# Patient Record
Sex: MALE | Race: OTHER | HISPANIC OR LATINO | URBAN - METROPOLITAN AREA
[De-identification: names, ages, dates, MRNs, and addresses within clinical notes are randomized per-mention and may not be internally consistent; named-entity substitution may affect disease eponyms.]

---

## 2021-03-17 ENCOUNTER — EMERGENCY (EMERGENCY)
Facility: HOSPITAL | Age: 66
LOS: 1 days | Discharge: HOME | End: 2021-03-17
Attending: EMERGENCY MEDICINE | Admitting: EMERGENCY MEDICINE
Payer: COMMERCIAL

## 2021-03-17 VITALS
RESPIRATION RATE: 18 BRPM | WEIGHT: 184.97 LBS | DIASTOLIC BLOOD PRESSURE: 74 MMHG | HEIGHT: 69 IN | HEART RATE: 100 BPM | OXYGEN SATURATION: 98 % | TEMPERATURE: 98 F | SYSTOLIC BLOOD PRESSURE: 143 MMHG

## 2021-03-17 DIAGNOSIS — R21 RASH AND OTHER NONSPECIFIC SKIN ERUPTION: ICD-10-CM

## 2021-03-17 DIAGNOSIS — L30.9 DERMATITIS, UNSPECIFIED: ICD-10-CM

## 2021-03-17 LAB
ALBUMIN SERPL ELPH-MCNC: 4.1 G/DL — SIGNIFICANT CHANGE UP (ref 3.3–5)
ALP SERPL-CCNC: 100 U/L — SIGNIFICANT CHANGE UP (ref 40–120)
ALT FLD-CCNC: 21 U/L — SIGNIFICANT CHANGE UP (ref 10–45)
ANION GAP SERPL CALC-SCNC: 10 MMOL/L — SIGNIFICANT CHANGE UP (ref 5–17)
AST SERPL-CCNC: 22 U/L — SIGNIFICANT CHANGE UP (ref 10–40)
BASOPHILS # BLD AUTO: 0.06 K/UL — SIGNIFICANT CHANGE UP (ref 0–0.2)
BASOPHILS NFR BLD AUTO: 0.6 % — SIGNIFICANT CHANGE UP (ref 0–2)
BILIRUB SERPL-MCNC: 0.3 MG/DL — SIGNIFICANT CHANGE UP (ref 0.2–1.2)
BUN SERPL-MCNC: 19 MG/DL — SIGNIFICANT CHANGE UP (ref 7–23)
CALCIUM SERPL-MCNC: 9.6 MG/DL — SIGNIFICANT CHANGE UP (ref 8.4–10.5)
CHLORIDE SERPL-SCNC: 105 MMOL/L — SIGNIFICANT CHANGE UP (ref 96–108)
CO2 SERPL-SCNC: 26 MMOL/L — SIGNIFICANT CHANGE UP (ref 22–31)
CREAT SERPL-MCNC: 0.84 MG/DL — SIGNIFICANT CHANGE UP (ref 0.5–1.3)
EOSINOPHIL # BLD AUTO: 0.32 K/UL — SIGNIFICANT CHANGE UP (ref 0–0.5)
EOSINOPHIL NFR BLD AUTO: 3.2 % — SIGNIFICANT CHANGE UP (ref 0–6)
GLUCOSE SERPL-MCNC: 100 MG/DL — HIGH (ref 70–99)
HCT VFR BLD CALC: 46 % — SIGNIFICANT CHANGE UP (ref 39–50)
HGB BLD-MCNC: 14.7 G/DL — SIGNIFICANT CHANGE UP (ref 13–17)
IMM GRANULOCYTES NFR BLD AUTO: 0.4 % — SIGNIFICANT CHANGE UP (ref 0–1.5)
LYMPHOCYTES # BLD AUTO: 1.94 K/UL — SIGNIFICANT CHANGE UP (ref 1–3.3)
LYMPHOCYTES # BLD AUTO: 19.6 % — SIGNIFICANT CHANGE UP (ref 13–44)
MCHC RBC-ENTMCNC: 26 PG — LOW (ref 27–34)
MCHC RBC-ENTMCNC: 32 GM/DL — SIGNIFICANT CHANGE UP (ref 32–36)
MCV RBC AUTO: 81.4 FL — SIGNIFICANT CHANGE UP (ref 80–100)
MONOCYTES # BLD AUTO: 0.83 K/UL — SIGNIFICANT CHANGE UP (ref 0–0.9)
MONOCYTES NFR BLD AUTO: 8.4 % — SIGNIFICANT CHANGE UP (ref 2–14)
NEUTROPHILS # BLD AUTO: 6.7 K/UL — SIGNIFICANT CHANGE UP (ref 1.8–7.4)
NEUTROPHILS NFR BLD AUTO: 67.8 % — SIGNIFICANT CHANGE UP (ref 43–77)
NRBC # BLD: 0 /100 WBCS — SIGNIFICANT CHANGE UP (ref 0–0)
PLATELET # BLD AUTO: 275 K/UL — SIGNIFICANT CHANGE UP (ref 150–400)
POTASSIUM SERPL-MCNC: 4.5 MMOL/L — SIGNIFICANT CHANGE UP (ref 3.5–5.3)
POTASSIUM SERPL-SCNC: 4.5 MMOL/L — SIGNIFICANT CHANGE UP (ref 3.5–5.3)
PROT SERPL-MCNC: 7.2 G/DL — SIGNIFICANT CHANGE UP (ref 6–8.3)
RBC # BLD: 5.65 M/UL — SIGNIFICANT CHANGE UP (ref 4.2–5.8)
RBC # FLD: 13.5 % — SIGNIFICANT CHANGE UP (ref 10.3–14.5)
SODIUM SERPL-SCNC: 141 MMOL/L — SIGNIFICANT CHANGE UP (ref 135–145)
WBC # BLD: 9.89 K/UL — SIGNIFICANT CHANGE UP (ref 3.8–10.5)
WBC # FLD AUTO: 9.89 K/UL — SIGNIFICANT CHANGE UP (ref 3.8–10.5)

## 2021-03-17 PROCEDURE — 99283 EMERGENCY DEPT VISIT LOW MDM: CPT

## 2021-03-17 PROCEDURE — 80053 COMPREHEN METABOLIC PANEL: CPT

## 2021-03-17 PROCEDURE — 36415 COLL VENOUS BLD VENIPUNCTURE: CPT

## 2021-03-17 PROCEDURE — 85025 COMPLETE CBC W/AUTO DIFF WBC: CPT

## 2021-03-17 PROCEDURE — 99284 EMERGENCY DEPT VISIT MOD MDM: CPT

## 2021-03-17 RX ORDER — DIPHENHYDRAMINE HCL 50 MG
25 CAPSULE ORAL ONCE
Refills: 0 | Status: COMPLETED | OUTPATIENT
Start: 2021-03-17 | End: 2021-03-17

## 2021-03-17 RX ADMIN — Medication 25 MILLIGRAM(S): at 15:52

## 2021-03-17 RX ADMIN — Medication 60 MILLIGRAM(S): at 15:52

## 2021-03-17 NOTE — ED ADULT NURSE NOTE - OBJECTIVE STATEMENT
pt is a 64 y/o M arriving to ED for c/c rash. per pt, reports pruritic macular rash to generalized body. pt states rash has progressively worsened throughout the week, with sloughing of skin noted to bilateral buttock. Seen by dermatologist last week, prescribed ketoconazole and nystatin creams with no improvement. Pt denies any known allergies, denies lip/throat swelling, no difficulty breathing. Pt speaking in full sentences. Pt denies chills, fever, n/v/d, cough, cp or sob.

## 2021-03-17 NOTE — ED PROVIDER NOTE - NSFOLLOWUPINSTRUCTIONS_ED_ALL_ED_FT
Take prednisone as prescribed.    Take benadryl over the counter as directed.     Follow up with dermatologist recommended below.    Return to ED with worsening symptoms or other concerns.          Dermatitis    WHAT YOU NEED TO KNOW:    What is dermatitis? Dermatitis is skin inflammation. You may have an itchy rash, redness, or swelling. You may also have bumps or blisters that crust over or ooze clear fluid.     What causes dermatitis? Dermatitis may be caused by allergens such as dust mites, pet dander, pollen, and certain foods. Dermatitis can also develop when something touches your skin and irritates it or causes an allergic reaction. Examples include soaps, chemicals, latex, and poison ivy.    How is dermatitis diagnosed? Your healthcare provider will examine your skin. He will ask questions about your rash and any other symptoms you have. Tell him if you noticed anything trigger your rash, such as a certain food or activity. Tell him about any medicines you are taking or any allergies or medical conditions you have.     How is dermatitis treated? Treatment depends on the cause of your rash. You may need medicines to help decrease itching and inflammation or treat a bacterial infection. They may be given as a topical cream, shot, or a pill.     How can I manage my symptoms?   •Apply a cool compress to your rash. This will help soothe your skin.       •Keep your skin moist. Rub unscented cream or lotion on your skin to prevent dryness and itching. Do this right after a lukewarm bath or shower when your skin is still damp.      •Avoid skin irritants. Do not use skin irritants, such as makeup, hair products, soaps, and cleansers. Use products that do not contain fragrances or dye.      Call 911 if you have any of the following symptoms of anaphylaxis:   •Sudden trouble breathing      •Throat swelling and tightness      •Dizziness, lightheadedness, fainting, or confusion       When should I seek immediate care?   •You develop a fever or have red streaks going up your arm or leg.      •Your rash gets more swollen, red, or hot.      When should I contact my healthcare provider?   •Your skin blisters, oozes white or yellow pus, or has a foul-smelling discharge.      •Your rash spreads or does not get better, even after treatment.      •You have questions or concerns about your condition or care.      CARE AGREEMENT:    You have the right to help plan your care. Learn about your health condition and how it may be treated. Discuss treatment options with your healthcare providers to decide what care you want to receive. You always have the right to refuse treatment.        © Copyright [employer] 2021           back to top                          © Copyright [employer] 2021

## 2021-03-17 NOTE — ED PROVIDER NOTE - CLINICAL SUMMARY MEDICAL DECISION MAKING FREE TEXT BOX
65M with a rash for x2 weeks that is erythematous and flakey in some areas. Pt previously  prescribed antifungal and Aquaphor. Will order basic blood work, white count, liver panels.     Plan: Will order benadryl and prednisone. Will instruct pt to seek f/u care with a dermatologist and to seek outpatient skin biopsy.     DDx: Exfoliated dermatitis, less likely Александр Trevor, less likely TEN. Considering there is no evidence for allergic reaction or sepsis. 65M with a rash for x2 weeks that is erythematous and flakey in some areas. Pt previously  prescribed antifungal and Aquaphor. Will order basic blood work, white count, liver panels.     Plan: Will order benadryl and prednisone. Will instruct pt to seek f/u care with a dermatologist and to seek outpatient skin biopsy.     DDx: Exfoliated dermatitis, less likely Александр Trevor, less likely TEN. Considering there is no evidence for allergic reaction or sepsis.    Labs wnl, will treat with prednisone and derm follow up

## 2021-03-17 NOTE — ED PROVIDER NOTE - CARE PROVIDER_API CALL
Marycruz Duncan)  Dermatology  [address] [place] [address], [place] [address]  Phone: [phone]  Fax: [phone]  Follow Up Time:

## 2021-03-17 NOTE — ED PROVIDER NOTE - SKIN, MLM
inflammatory, erythematous rash of extremities trunk, and back and groin. Groin and buttocks notable for sloughing rash. No fluctuance, no target signs, no drainage, no blisters, no palm or soles involvement

## 2021-03-17 NOTE — ED PROVIDER NOTE - ENMT, MLM
Airway patent, Nasal mucosa clear. Mouth with normal mucosa. Throat has no vesicles, no oropharyngeal exudates and uvula is midline.  No throat swelling.

## 2021-03-17 NOTE — ED ADULT NURSE NOTE - DOES PATIENT HAVE ADVANCE DIRECTIVE
Called and spoke with Billie at [facility] in [place].  She stated that there is some issue with her insurance.  She stated that her prescription does need a prior authorization and they will send over the form.    PA submitted marked urgent via Cover My Meds:  Zoila Guadalupe (Key: [id number])     The request has received a Pending outcome.  Please note any additional information provided by [employer] at the bottom of your screen.  You will receive a final determination electronically in CoverMyMeds and via email and fax within 24 to 72 hours    Called and spoke with Evelio at [facility] pharmacy notifying him the the PA was submitted but still pending approval to determine if they are able to provide doses to last until Monday.   Evelio stated that they will provide enough medication to get the patient through the weekend.  Called and notified the patient of this.     No

## 2021-03-17 NOTE — ED ADULT TRIAGE NOTE - CHIEF COMPLAINT QUOTE
"He had an allergic reaction for the last week and a half." Pt reports itchy rash over generalized body. Pt denies any known allergies, denies lip/throat swelling, no difficulty breathing. Pt speaking in full sentences. Pt denies chills, fever.

## 2021-03-17 NOTE — ED PROVIDER NOTE - PATIENT PORTAL LINK FT
You can access the FollowMyHealth Patient Portal offered by [facility] by registering at the following website: [email or web address]. By joining [employer]’s FollowMyHealth portal, you will also be able to view your health information using other applications (apps) compatible with our system.

## 2021-03-17 NOTE — ED PROVIDER NOTE - OBJECTIVE STATEMENT
65M presents for a rash of x2 weeks, that is described as red, painful and itchy. First started on antifungal, Ketoconazole  and nystatin upon being seen by dermatologist x2 weeks ago. Patient was then given Aquaphor x1 week ago, with no significant symptom relief. Patient has not used any new creams, lotions, perfumes, detergents, soaps or antibiotic treatments. Patient admits to no throat swelling, no SOB, no fever, no chills or any other acute medical complaints at this time.

## 2021-03-18 PROBLEM — Z00.00 ENCOUNTER FOR PREVENTIVE HEALTH EXAMINATION: Status: ACTIVE | Noted: 2021-03-18

## 2021-03-23 ENCOUNTER — APPOINTMENT (OUTPATIENT)
Dept: DERMATOLOGY | Facility: CLINIC | Age: 66
End: 2021-03-23
Payer: SELF-PAY

## 2021-03-23 PROBLEM — Z78.9 OTHER SPECIFIED HEALTH STATUS: Chronic | Status: ACTIVE | Noted: 2021-03-17

## 2021-03-23 PROCEDURE — 99203 OFFICE O/P NEW LOW 30 MIN: CPT

## 2021-03-30 ENCOUNTER — NON-APPOINTMENT (OUTPATIENT)
Age: 66
End: 2021-03-30

## 2021-04-06 ENCOUNTER — APPOINTMENT (OUTPATIENT)
Dept: DERMATOLOGY | Facility: CLINIC | Age: 66
End: 2021-04-06
Payer: SELF-PAY

## 2021-04-06 DIAGNOSIS — L73.9 FOLLICULAR DISORDER, UNSPECIFIED: ICD-10-CM

## 2021-04-06 PROCEDURE — 99213 OFFICE O/P EST LOW 20 MIN: CPT

## 2021-04-06 RX ORDER — DOXYCYCLINE HYCLATE 100 MG/1
100 TABLET ORAL
Qty: 20 | Refills: 0 | Status: ACTIVE | COMMUNITY
Start: 2021-04-06 | End: 1900-01-01

## 2021-04-21 ENCOUNTER — APPOINTMENT (OUTPATIENT)
Dept: DERMATOLOGY | Facility: CLINIC | Age: 66
End: 2021-04-21
Payer: SELF-PAY

## 2021-04-21 DIAGNOSIS — L30.9 DERMATITIS, UNSPECIFIED: ICD-10-CM

## 2021-04-21 DIAGNOSIS — L72.9 FOLLICULAR CYST OF THE SKIN AND SUBCUTANEOUS TISSUE, UNSPECIFIED: ICD-10-CM

## 2021-04-21 PROCEDURE — 99213 OFFICE O/P EST LOW 20 MIN: CPT

## 2021-04-21 RX ORDER — TRIAMCINOLONE ACETONIDE 1 MG/G
0.1 OINTMENT TOPICAL
Qty: 1 | Refills: 1 | Status: ACTIVE | COMMUNITY
Start: 2021-03-23 | End: 1900-01-01

## 2021-05-18 ENCOUNTER — APPOINTMENT (OUTPATIENT)
Dept: DERMATOLOGY | Facility: CLINIC | Age: 66
End: 2021-05-18

## 2022-04-16 NOTE — ED ADULT NURSE NOTE - NS ED NOTE ABUSE SUSPICION NEGLECT YN
Problem: Falls - Risk of  Goal: *Absence of Falls  Description: Document Marv Felix Fall Risk and appropriate interventions in the flowsheet. Outcome: Progressing Towards Goal  Note: Fall Risk Interventions:            Medication Interventions: Teach patient to arise slowly,Patient to call before getting OOB    Elimination Interventions: Call light in reach,Patient to call for help with toileting needs    History of Falls Interventions:  Investigate reason for fall,Door open when patient unattended         Problem: Patient Education: Go to Patient Education Activity  Goal: Patient/Family Education  Outcome: Progressing Towards Goal     Problem: Discharge Planning  Goal: *Discharge to safe environment  Outcome: Progressing Towards Goal  Goal: *Knowledge of discharge instructions  Outcome: Progressing Towards Goal     Problem: Patient Education: Go to Patient Education Activity  Goal: Patient/Family Education  Outcome: Progressing Towards Goal     Problem: TIA/CVA Stroke: 0-24 hours  Goal: Activity/Safety  Outcome: Progressing Towards Goal  Goal: Psychosocial  Outcome: Progressing Towards Goal  Goal: *Neurologically stable  Description: Absence of additional neurological deficits    Outcome: Progressing Towards Goal  Goal: *Verbalizes anxiety and depression are reduced or absent  Outcome: Progressing Towards Goal No

## 2024-06-07 NOTE — HISTORY OF PRESENT ILLNESS
[FreeTextEntry1] : Mr. Ruff is a 69 y.o male with PMHX of ______________ referred by Dr. Kuhn dx with prostate adenocarcinoma. Biopsy report missing.   PSA 4/1/24--> 2.60 ng/ml  Lupron --> 4/1/24  Ct abd/Pelvis 3/25/24 - no renal or bladder calculi -s/p prostatectomy  -Bilateral renal cysts

## 2024-06-17 ENCOUNTER — NON-APPOINTMENT (OUTPATIENT)
Age: 69
End: 2024-06-17

## 2024-06-18 ENCOUNTER — APPOINTMENT (OUTPATIENT)
Dept: RADIATION ONCOLOGY | Facility: CLINIC | Age: 69
End: 2024-06-18
Payer: MEDICARE

## 2024-06-18 VITALS
TEMPERATURE: 98.1 F | HEIGHT: 68 IN | OXYGEN SATURATION: 97 % | WEIGHT: 190 LBS | RESPIRATION RATE: 16 BRPM | HEART RATE: 77 BPM | BODY MASS INDEX: 28.79 KG/M2 | SYSTOLIC BLOOD PRESSURE: 130 MMHG | DIASTOLIC BLOOD PRESSURE: 82 MMHG

## 2024-06-18 DIAGNOSIS — Z78.9 OTHER SPECIFIED HEALTH STATUS: ICD-10-CM

## 2024-06-18 DIAGNOSIS — Z87.891 PERSONAL HISTORY OF NICOTINE DEPENDENCE: ICD-10-CM

## 2024-06-18 DIAGNOSIS — C61 MALIGNANT NEOPLASM OF PROSTATE: ICD-10-CM

## 2024-06-18 PROCEDURE — 99203 OFFICE O/P NEW LOW 30 MIN: CPT

## 2024-06-18 RX ORDER — NICOTINE POLACRILEX 4 MG/1
4 GUM, CHEWING ORAL
Qty: 24 | Refills: 0 | Status: ACTIVE | COMMUNITY
Start: 2024-06-18 | End: 1900-01-01

## 2024-06-18 NOTE — VITALS
[Maximal Pain Intensity: 0/10] : 0/10 [Least Pain Intensity: 0/10] : 0/10 [90: Able to carry normal activity; minor signs or symptoms of disease.] : 90: Able to carry normal activity; minor signs or symptoms of disease.  [ECOG Performance Status: 0 - Fully active, able to carry on all pre-disease performance without restriction] : Performance Status: 0 - Fully active, able to carry on all pre-disease performance without restriction [Date: ____________] : Patient's last distress assessment performed on [unfilled]. [0 - No Distress] : Distress Level: 0

## 2024-06-18 NOTE — SOCIAL HISTORY
[Current Cigarette ___ Pack(s) per day] : current cigarette pack(s) per day:  [unfilled]  [de-identified] : Social alcohol

## 2024-06-18 NOTE — DISEASE MANAGEMENT
[Pathological] : TNM Stage: p [FreeTextEntry4] : Biochemical failure post prostatectomy [TTNM] : 2 [NTNM] : x [MTNM] : 0 [N/A] : Currently not applicable

## 2024-06-18 NOTE — HISTORY OF PRESENT ILLNESS
[FreeTextEntry1] : Mr. Ruff is a 69 year old male with no family history of Prostate Ca. presented with PSA 13 ng/ml (according to patient's sister) in 2016.Same year had robotic radical prostatectomy by Dr. Pineda at [facility]. Pathology report not available. In December 2022 underwent insertion of penile implant. PSA by Dr. Kuhn on 4/1/24 was 2.6 ng/ml. CT scan abdomen and pelvis revealed status post prostatectomy, no adenopathy or bone changes. Benign renal cyst.  He is experiencing urinary urgency, mild frequency fluid intake related, nocturia 3x, occasional urge incontinence. He denies dysuria or bowel disturbances. He denies wearing pads/depends. Penile implant.  Received one injection LHRH agonist.    Detail Level: Simple

## 2024-06-18 NOTE — LETTER GREETING
[Consult Letter:] : Your patient, [unfilled] was seen in my office today for consultation. [FreeTextEntry2] : Alphonso Kuhn M.D

## 2024-06-18 NOTE — REVIEW OF SYSTEMS
[Nocturia] : nocturia [Urinary Frequency] : urinary frequency [IPSS Score (0-40): ___] : IPSS score: [unfilled] [EPIC-CP Score (0-60): ___] : EPIC-CP score: [unfilled] [Negative] : Allergic/Immunologic [Anal Pain: Grade 0] : Anal Pain: Grade 0 [Constipation: Grade 0] : Constipation: Grade 0 [Diarrhea: Grade 0] : Diarrhea: Grade 0 [Dyspepsia: Grade 0] : Dyspepsia: Grade 0 [Dysphagia: Grade 0] : Dysphagia: Grade 0 [Esophagitis: Grade 0] : Esophagitis: Grade 0 [Fecal Incontinence: Grade 0] : Fecal Incontinence: Grade 0 [Gastroparesis: Grade 0] : Gastroparesis: Grade 0 [Nausea: Grade 0] : Nausea: Grade 0 [Proctitis: Grade 0] : Proctitis: Grade 0 [Rectal Pain: Grade 0] : Rectal Pain: Grade 0 [Vomiting: Grade 0] : Vomiting: Grade 0 [Edema Limbs: Grade 0] : Edema Limbs: Grade 0  [Hematuria: Grade 0] : Hematuria: Grade 0 [Urinary Retention: Grade 0] : Urinary Retention: Grade 0 [Urinary Tract Pain: Grade 0] : Urinary Tract Pain: Grade 0 [Urinary Urgency: Grade 2 - Limiting instrumental ADL; medical management indicated] : Urinary Urgency: Grade 2 - Limiting instrumental ADL; medical management indicated [Urinary Frequency: Grade 2 - Limiting instrumental ADL; medical management indicated] : Urinary Frequency: Grade 2 - Limiting instrumental ADL; medical management indicated [Tinnitus - Grade 0] : Tinnitus - Grade 0 [Cognitive Disturbance: Grade 0] : Cognitive Disturbance: Grade 0 [Concentration Impairment: Grade 0] : Concentration Impairment: Grade 0 [Alopecia: Grade 0] : Alopecia: Grade 0 [Skin Atrophy: Grade 0] : Skin Atrophy: Grade 0 [Urinary Ostomy] : no ~T urinary ostomy present [Urinary Incontinence: Grade 1 - Occasional (e.g., with coughing, sneezing, etc.), pads not indicated] : Urinary Incontinence: Grade 1 - Occasional (e.g., with coughing, sneezing, etc.), pads not indicated [FreeTextEntry7] : penial implant [FreeTextEntry8] : penial implant [FreeTextEntry4] : allergic reaction to a medication   Seeing an allergist

## 2024-06-28 ENCOUNTER — OUTPATIENT (OUTPATIENT)
Dept: OUTPATIENT SERVICES | Facility: HOSPITAL | Age: 69
LOS: 1 days | End: 2024-06-28
Payer: MEDICARE

## 2024-06-28 ENCOUNTER — APPOINTMENT (OUTPATIENT)
Dept: NUCLEAR MEDICINE | Facility: HOSPITAL | Age: 69
End: 2024-06-28

## 2024-06-28 PROCEDURE — A9595: CPT

## 2024-06-28 PROCEDURE — 78816 PET IMAGE W/CT FULL BODY: CPT | Mod: 26

## 2024-06-28 PROCEDURE — 78816 PET IMAGE W/CT FULL BODY: CPT

## 2024-08-02 ENCOUNTER — NON-APPOINTMENT (OUTPATIENT)
Age: 69
End: 2024-08-02

## 2024-08-06 ENCOUNTER — NON-APPOINTMENT (OUTPATIENT)
Age: 69
End: 2024-08-06

## 2024-08-07 NOTE — DISEASE MANAGEMENT
[Pathological] : TNM Stage: p [FreeTextEntry4] : Biochemical failure post prostatectomy [TTNM] : 2 [MTNM] : 0 [NTNM] : x [N/A] : Currently not applicable [de-identified] : 762cGy [de-identified] : 1150cGy [de-identified] : Prostate

## 2024-08-07 NOTE — HISTORY OF PRESENT ILLNESS
[FreeTextEntry1] : Mr. Ruff is a 69 year old male with no family history of Prostate Ca. presented with PSA 13 ng/ml (according to patient's sister) in 2016.Same year had robotic radical prostatectomy by Dr. Pineda at [facility]. Pathology report not available. In December 2022 underwent insertion of penile implant. PSA by Dr. Kuhn on 4/1/24 was 2.6 ng/ml. CT scan abdomen and pelvis revealed status post prostatectomy, no adenopathy or bone changes. Benign renal cyst.  He is experiencing urinary urgency, mild frequency fluid intake related, nocturia 3x, occasional urge incontinence. He denies dysuria or bowel disturbances. He denies wearing pads/depends. Penile implant.  Received one injection LHRH agonist.  8/7/2024 - OTV Patient has received 720/4500cGy of radiation to the Prostate. Today he.....

## 2024-08-08 ENCOUNTER — NON-APPOINTMENT (OUTPATIENT)
Age: 69
End: 2024-08-08

## 2024-08-08 NOTE — PHYSICAL EXAM
[Normal] : the sclera and conjunctiva were normal, pupils were equal in size, round, reactive to light and extraocular movements were intact. [FreeTextEntry1] : Minimal LUTS, occasional.

## 2024-08-08 NOTE — HISTORY OF PRESENT ILLNESS
[FreeTextEntry1] : Mr. Ruff is a 69 year old male with no family history of Prostate Ca. presented with PSA 13 ng/ml (according to patient's sister) in 2016.Same year had robotic radical prostatectomy by Dr. Pineda at [facility]. Pathology report not available. In December 2022 underwent insertion of penile implant. PSA by Dr. Kuhn on 4/1/24 was 2.6 ng/ml. CT scan abdomen and pelvis revealed status post prostatectomy, no adenopathy or bone changes. Benign renal cyst.  He is experiencing urinary urgency, mild frequency fluid intake related, nocturia 3x, occasional urge incontinence. He denies dysuria or bowel disturbances. He denies wearing pads/depends. Penile implant.  Received one injection LHRH agonist.  8/8/2024 - OTV  ID# [id number] Patient has received 720/7020cGy of radiation to the Prostate. Today he.is doing well. Has some urinary urgency and frequency but he contributes to drinking slot of PO intake. He denies urinary dysuria or hematuria. Nocturia X2-3 and on no Flomax. Bowel movements are normal. He will continue planned RT treatments.

## 2024-08-08 NOTE — DISEASE MANAGEMENT
[FreeTextEntry4] : Biochemical failure post prostatectomy [TTNM] : 2 [NTNM] : x [MTNM] : 0 [de-identified] : 265cGy [de-identified] : 7076cGy [de-identified] : Prostate

## 2024-08-13 ENCOUNTER — NON-APPOINTMENT (OUTPATIENT)
Age: 69
End: 2024-08-13

## 2024-08-13 NOTE — REVIEW OF SYSTEMS
[Anal Pain: Grade 0] : Anal Pain: Grade 0 [Diarrhea: Grade 0] : Diarrhea: Grade 0 [Hematuria: Grade 0] : Hematuria: Grade 0 [Urinary Incontinence: Grade 1 - Occasional (e.g., with coughing, sneezing, etc.), pads not indicated] : Urinary Incontinence: Grade 1 - Occasional (e.g., with coughing, sneezing, etc.), pads not indicated [Urinary Retention: Grade 0] : Urinary Retention: Grade 0 [Urinary Tract Pain: Grade 0] : Urinary Tract Pain: Grade 0 [Urinary Urgency: Grade 1 - Present] : Urinary Urgency: Grade 1 - Present [Urinary Frequency: Grade 0] : Urinary Frequency: Grade 0

## 2024-08-13 NOTE — DISEASE MANAGEMENT
[Pathological] : TNM Stage: p [N/A] : Currently not applicable [FreeTextEntry4] : Biochemical failure post prostatectomy [TTNM] : 2 [NTNM] : x [MTNM] : 0 [de-identified] : 1260 cGy [de-identified] : 7030cGy [de-identified] : Prostate

## 2024-08-13 NOTE — HISTORY OF PRESENT ILLNESS
[FreeTextEntry1] : Mr. Ruff is a 69 year old male with no family history of Prostate Ca. presented with PSA 13 ng/ml (according to patient's sister) in 2016.Same year had robotic radical prostatectomy by Dr. Pineda at [facility]. Pathology report not available. In December 2022 underwent insertion of penile implant. PSA by Dr. Kuhn on 4/1/24 was 2.6 ng/ml. CT scan abdomen and pelvis revealed status post prostatectomy, no adenopathy or bone changes. Benign renal cyst.  He is experiencing urinary urgency, mild frequency fluid intake related, nocturia 3x, occasional urge incontinence. He denies dysuria or bowel disturbances. He denies wearing pads/depends. Penile implant.  Received one injection LHRH agonist.  8/13/2024- OTV- 1260/7020 cgy, 7/39 fractions. nocturia 2x, stream is good, no dysuria, no frequency,  occasional urge incontinence with intermittency. unchanged with RT. Regular BM.   8/8/2024 - OTV  ID# [id number] Patient has received 720/7020cGy of radiation to the Prostate. Today he.is doing well. Has some urinary urgency and frequency but he contributes to drinking slot of PO intake. He denies urinary dysuria or hematuria. Nocturia X2-3 and on no Flomax. Bowel movements are normal. He will continue planned RT treatments.

## 2024-08-13 NOTE — PHYSICAL EXAM
[Normal] : the sclera and conjunctiva were normal, pupils were equal in size, round, reactive to light and extraocular movements were intact. [FreeTextEntry1] : Alert. oriented. KPS=90.

## 2024-08-13 NOTE — DISEASE MANAGEMENT
[Pathological] : TNM Stage: p [N/A] : Currently not applicable [FreeTextEntry4] : Biochemical failure post prostatectomy [TTNM] : 2 [NTNM] : x [MTNM] : 0 [de-identified] : 1260 cGy [de-identified] : 7060cGy [de-identified] : Prostate

## 2024-08-13 NOTE — HISTORY OF PRESENT ILLNESS
[FreeTextEntry1] : Mr. Ruff is a 69 year old male with no family history of Prostate Ca. presented with PSA 13 ng/ml (according to patient's sister) in 2016.Same year had robotic radical prostatectomy by Dr. Pineda at [facility]. Pathology report not available. In December 2022 underwent insertion of penile implant. PSA by Dr. Kuhn on 4/1/24 was 2.6 ng/ml. CT scan abdomen and pelvis revealed status post prostatectomy, no adenopathy or bone changes. Benign renal cyst.  He is experiencing urinary urgency, mild frequency fluid intake related, nocturia 3x, occasional urge incontinence. He denies dysuria or bowel disturbances. He denies wearing pads/depends. Penile implant.  Received one injection LHRH agonist.  8/13/2024- OTV- 1260/7020 cgy, 7/39 fractions. nocturia 2x, stream is good, no dysuria, no frequency,  occasional urge incontinence with intermittency. unchanged with RT. Regular BM.   8/8/2024 - OTV  ID# [id number] Patient has received 720/7020cGy of radiation to the Prostate. Today he.is doing well. Has some urinary urgency and frequency but he contributes to drinking slot of PO intake. He denies urinary dysuria or hematuria. Nocturia X2-3 and on no Flomax. Bowel movements are normal. He will continue planned RT treatments.

## 2024-08-21 ENCOUNTER — NON-APPOINTMENT (OUTPATIENT)
Age: 69
End: 2024-08-21

## 2024-08-21 NOTE — HISTORY OF PRESENT ILLNESS
[FreeTextEntry1] : Mr. Ruff is a 69 year old male with no family history of Prostate Ca. presented with PSA 13 ng/ml (according to patient's sister) in 2016.Same year had robotic radical prostatectomy by Dr. Pineda at [facility]. Pathology report not available. In December 2022 underwent insertion of penile implant. PSA by Dr. Kuhn on 4/1/24 was 2.6 ng/ml. CT scan abdomen and pelvis revealed status post prostatectomy, no adenopathy or bone changes. Benign renal cyst.  He is experiencing urinary urgency, mild frequency fluid intake related, nocturia 3x, occasional urge incontinence. He denies dysuria or bowel disturbances. He denies wearing pads/depends. Penile implant.  Received one injection LHRH agonist.  8/21/2024 - OT  # [id number] Patient has received 2340/4500cGy or radiation to the prostate. Patient states he is feeling "fine", he denies dysuria, hematuria, frequency, fatigue, pain or bowel changes. He states he has and continues to have urgency. Patient to continue RT as planned.    8/13/2024- OTV- 1260/7020 cgy, 7/39 fractions. nocturia 2x, stream is good, no dysuria, no frequency,  occasional urge incontinence with intermittency. unchanged with RT. Regular BM.   8/8/2024 - OTV  ID# [id number] Patient has received 720/7020cGy of radiation to the Prostate. Today he.is doing well. Has some urinary urgency and frequency but he contributes to drinking slot of PO intake. He denies urinary dysuria or hematuria. Nocturia X2-3 and on no Flomax. Bowel movements are normal. He will continue planned RT treatments.

## 2024-08-21 NOTE — REVIEW OF SYSTEMS
[Anal Pain: Grade 0] : Anal Pain: Grade 0 [Constipation: Grade 0] : Constipation: Grade 0 [Diarrhea: Grade 0] : Diarrhea: Grade 0 [Nausea: Grade 0] : Nausea: Grade 0 [Vomiting: Grade 0] : Vomiting: Grade 0 [Fatigue: Grade 0] : Fatigue: Grade 0 [Hematuria: Grade 0] : Hematuria: Grade 0 [Urinary Urgency: Grade 1 - Present] : Urinary Urgency: Grade 1 - Present [Urinary Frequency: Grade 0] : Urinary Frequency: Grade 0 [Dizziness: Grade 0] : Dizziness: Grade 0  [Headache: Grade 0] : Headache: Grade 0 [Anxiety: Grade 0] : Anxiety: Grade 0  [Cough: Grade 0] : Cough: Grade 0 [Dyspnea: Grade 0] : Dyspnea: Grade 0 [Dermatitis Radiation: Grade 0] : Dermatitis Radiation: Grade 0

## 2024-08-21 NOTE — DISEASE MANAGEMENT
[Pathological] : TNM Stage: p [N/A] : Currently not applicable [FreeTextEntry4] : Biochemical failure post prostatectomy [TTNM] : 2 [NTNM] : x [MTNM] : 0 [de-identified] : 0045lWi [de-identified] : 0550cGy [de-identified] : Prostate

## 2024-08-28 ENCOUNTER — NON-APPOINTMENT (OUTPATIENT)
Age: 69
End: 2024-08-28

## 2024-08-28 RX ORDER — TAMSULOSIN HYDROCHLORIDE 0.4 MG/1
0.4 CAPSULE ORAL
Qty: 30 | Refills: 5 | Status: ACTIVE | COMMUNITY
Start: 2024-08-28 | End: 1900-01-01

## 2024-08-28 NOTE — DISEASE MANAGEMENT
[Pathological] : TNM Stage: p [N/A] : Currently not applicable [FreeTextEntry4] : Biochemical failure post prostatectomy [TTNM] : 2 [NTNM] : x [MTNM] : 0 [de-identified] : [id number] [de-identified] : 0570cGy [de-identified] : Prostate

## 2024-08-28 NOTE — DISEASE MANAGEMENT
[Pathological] : TNM Stage: p [N/A] : Currently not applicable [FreeTextEntry4] : Biochemical failure post prostatectomy [TTNM] : 2 [NTNM] : x [MTNM] : 0 [de-identified] : 3588cUk [de-identified] : 0680cGy [de-identified] : Prostate

## 2024-08-28 NOTE — HISTORY OF PRESENT ILLNESS
[FreeTextEntry1] : Mr. Ruff is a 69 year old male with no family history of Prostate Ca. presented with PSA 13 ng/ml (according to patient's sister) in 2016.Same year had robotic radical prostatectomy by Dr. Pineda at [facility]. Pathology report not available. In December 2022 underwent insertion of penile implant. PSA by Dr. Kuhn on 4/1/24 was 2.6 ng/ml. CT scan abdomen and pelvis revealed status post prostatectomy, no adenopathy or bone changes. Benign renal cyst.  He is experiencing urinary urgency, mild frequency fluid intake related, nocturia 3x, occasional urge incontinence. He denies dysuria or bowel disturbances. He denies wearing pads/depends. Penile implant.  Received one injection LHRH agonist.   # [id number] 8/28/2024-OTV Mr. Ruff has completed 18/39 Fx or 3240/7020cGy to the Prostate, He is experiencing urinary urgency, frequency with weak stream Nocturia X3  I ordered Flomax. He denies dysuria and Bowel movements are normal. He will continue his planned Rt treatments.  8/21/2024 - OT  # [id number] Patient has received 2340/4500cGy or radiation to the prostate. Patient states he is feeling "fine", he denies dysuria, hematuria, frequency, fatigue, pain or bowel changes. He states he has and continues to have urgency. Patient to continue RT as planned.    8/13/2024- OTV- 1260/7020 cgy, 7/39 fractions. nocturia 2x, stream is good, no dysuria, no frequency,  occasional urge incontinence with intermittency. unchanged with RT. Regular BM.   8/8/2024 - OTV  ID# [id number] Patient has received 720/7020cGy of radiation to the Prostate. Today he.is doing well. Has some urinary urgency and frequency but he contributes to drinking slot of PO intake. He denies urinary dysuria or hematuria. Nocturia X2-3 and on no Flomax. Bowel movements are normal. He will continue planned RT treatments.

## 2024-08-28 NOTE — HISTORY OF PRESENT ILLNESS
[FreeTextEntry1] : Mr. Ruff is a 69 year old male with no family history of Prostate Ca. presented with PSA 13 ng/ml (according to patient's sister) in 2016.Same year had robotic radical prostatectomy by Dr. Pienda at [facility]. Pathology report not available. In December 2022 underwent insertion of penile implant. PSA by Dr. Kuhn on 4/1/24 was 2.6 ng/ml. CT scan abdomen and pelvis revealed status post prostatectomy, no adenopathy or bone changes. Benign renal cyst.  He is experiencing urinary urgency, mild frequency fluid intake related, nocturia 3x, occasional urge incontinence. He denies dysuria or bowel disturbances. He denies wearing pads/depends. Penile implant.  Received one injection LHRH agonist.   # [id number] 8/28/2024-OTV Mr. Ruff has completed 18/39 Fx or 3240/7020cGy to the Prostate, He is experiencing urinary urgency, frequency with weak stream Nocturia X3  I ordered Flomax. He denies dysuria and Bowel movements are normal. He will continue his planned Rt treatments.  8/21/2024 - OT  # [id number] Patient has received 2340/4500cGy or radiation to the prostate. Patient states he is feeling "fine", he denies dysuria, hematuria, frequency, fatigue, pain or bowel changes. He states he has and continues to have urgency. Patient to continue RT as planned.    8/13/2024- OTV- 1260/7020 cgy, 7/39 fractions. nocturia 2x, stream is good, no dysuria, no frequency,  occasional urge incontinence with intermittency. unchanged with RT. Regular BM.   8/8/2024 - OTV  ID# [id number] Patient has received 720/7020cGy of radiation to the Prostate. Today he.is doing well. Has some urinary urgency and frequency but he contributes to drinking slot of PO intake. He denies urinary dysuria or hematuria. Nocturia X2-3 and on no Flomax. Bowel movements are normal. He will continue planned RT treatments.

## 2024-09-04 ENCOUNTER — NON-APPOINTMENT (OUTPATIENT)
Age: 69
End: 2024-09-04

## 2024-09-04 NOTE — PHYSICAL EXAM
[Normal] : oriented to person, place and time, the affect was normal, the mood was normal and not anxious [de-identified] : rash at waist line

## 2024-09-04 NOTE — REVIEW OF SYSTEMS
[Anal Pain: Grade 0] : Anal Pain: Grade 0 [Constipation: Grade 0] : Constipation: Grade 0 [Diarrhea: Grade 0] : Diarrhea: Grade 0 [Nausea: Grade 0] : Nausea: Grade 0 [Vomiting: Grade 0] : Vomiting: Grade 0 [Fatigue: Grade 0] : Fatigue: Grade 0 [Hematuria: Grade 0] : Hematuria: Grade 0 [Urinary Incontinence: Grade 0] : Urinary Incontinence: Grade 0  [Urinary Retention: Grade 0] : Urinary Retention: Grade 0 [Urinary Tract Pain: Grade 0] : Urinary Tract Pain: Grade 0 [Urinary Urgency: Grade 0] : Urinary Urgency: Grade 0 [Urinary Frequency: Grade 0] : Urinary Frequency: Grade 0 [Cognitive Disturbance: Grade 0] : Cognitive Disturbance: Grade 0 [Concentration Impairment: Grade 0] : Concentration Impairment: Grade 0 [Skin Hyperpigmentation: Grade 0] : Skin Hyperpigmentation: Grade 0 [Dermatitis Radiation: Grade 0] : Dermatitis Radiation: Grade 0

## 2024-09-04 NOTE — DISEASE MANAGEMENT
[Pathological] : TNM Stage: p [N/A] : Currently not applicable [FreeTextEntry4] : Biochemical failure post prostatectomy [TTNM] : 2 [NTNM] : x [MTNM] : 0 [de-identified] : 4751sYs [de-identified] : 5030cGy [de-identified] : Prostate

## 2024-09-04 NOTE — HISTORY OF PRESENT ILLNESS
[FreeTextEntry1] : Mr. Ruff is a 69 year old male with no family history of Prostate Ca. presented with PSA 13 ng/ml (according to patient's sister) in 2016.Same year had robotic radical prostatectomy by Dr. Pineda at [facility]. Pathology report not available. In December 2022 underwent insertion of penile implant. PSA by Dr. Kuhn on 4/1/24 was 2.6 ng/ml. CT scan abdomen and pelvis revealed status post prostatectomy, no adenopathy or bone changes. Benign renal cyst.  He is experiencing urinary urgency, mild frequency fluid intake related, nocturia 3x, occasional urge incontinence. He denies dysuria or bowel disturbances. He denies wearing pads/depends. Penile implant.  Received one injection LHRH agonist.  9/4/2024 - OTV Patient has completed 3960/4500cGy of radiation to the prostate. Patient states he is feeling well and has no urinary issues. 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 He has seen a dermatologist and is using Pacrolimus cream for this. It appears around his underpant/belt line. He offers no other complaints/concerns. He denies dysuria, hematuria, or other urinary symptoms. Patient to continue RT as planned.   # [id number] 8/28/2024-OTV Mr. Ruff has completed 18/39 Fx or 3240/7020cGy to the Prostate, He is experiencing urinary urgency, frequency with weak stream Nocturia X3  I ordered Flomax. He denies dysuria and Bowel movements are normal. He will continue his planned Rt treatments.  8/21/2024 - OT  # [id number] Patient has received 2340/4500cGy or radiation to the prostate. Patient states he is feeling "fine", he denies dysuria, hematuria, frequency, fatigue, pain or bowel changes. He states he has and continues to have urgency. Patient to continue RT as planned.    8/13/2024- OTV- 1260/7020 cgy, 7/39 fractions. nocturia 2x, stream is good, no dysuria, no frequency,  occasional urge incontinence with intermittency. unchanged with RT. Regular BM.   8/8/2024 - OTV  ID# [id number] Patient has received 720/7020cGy of radiation to the Prostate. Today he.is doing well. Has some urinary urgency and frequency but he contributes to drinking slot of PO intake. He denies urinary dysuria or hematuria. Nocturia X2-3 and on no Flomax. Bowel movements are normal. He will continue planned RT treatments.

## 2024-09-06 ENCOUNTER — APPOINTMENT (OUTPATIENT)
Dept: CT IMAGING | Facility: HOSPITAL | Age: 69
End: 2024-09-06

## 2024-09-10 ENCOUNTER — NON-APPOINTMENT (OUTPATIENT)
Age: 69
End: 2024-09-10

## 2024-09-10 NOTE — DISEASE MANAGEMENT
[Clinical] : TNM Stage: c [TTNM] : 2 [NTNM] : 0 [MTNM] : 0 [IIC] : IIC [de-identified] : 8470 [de-identified] : 5946 [de-identified] : prostate bed  and nodes

## 2024-09-10 NOTE — HISTORY OF PRESENT ILLNESS
[FreeTextEntry1] : Mr. Ruff is a 69 year old male with no family history of Prostate Ca. presented with PSA 13 ng/ml (according to patient's sister) in 2016.Same year had robotic radical prostatectomy by Dr. Pineda at [facility]. Pathology report not available. In December 2022 underwent insertion of penile implant. PSA by Dr. Kuhn on 4/1/24 was 2.6 ng/ml. CT scan abdomen and pelvis revealed status post prostatectomy, no adenopathy or bone changes. Benign renal cyst. He is experiencing urinary urgency, mild frequency fluid intake related, nocturia 3x, occasional urge incontinence. He denies dysuria or bowel disturbances. He denies wearing pads/depends. Penile implant. Received one injection LHRH agonist.  9/4/2024 - OTV Patient has completed 3960/4500cGy of radiation to the prostate. Patient states he is feeling well and has no urinary issues. 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 He has seen a dermatologist and is using Pacrolimus cream for this. It appears around his underpant/belt line. He offers no other complaints/concerns. He denies dysuria, hematuria, or other urinary symptoms. Patient to continue RT as planned.   # [id number] 8/28/2024-OTV Mr. Ruff has completed 18/39 Fx or 3240/7020cGy to the Prostate, He is experiencing urinary urgency, frequency with weak stream Nocturia X3 I ordered Flomax. He denies dysuria and Bowel movements are normal. He will continue his planned Rt treatments.  8/21/2024 - OT  # [id number] Patient has received 2340/4500cGy or radiation to the prostate. Patient states he is feeling "fine", he denies dysuria, hematuria, frequency, fatigue, pain or bowel changes. He states he has and continues to have urgency. Patient to continue RT as planned.   8/13/2024- OTV- 1260/7020 cgy, 7/39 fractions. nocturia 2x, stream is good, no dysuria, no frequency, occasional urge incontinence with intermittency. unchanged with RT. Regular BM.  8/8/2024 - OTV  ID# [id number] Patient has received 720/7020cGy of radiation to the Prostate. Today he.is doing well. Has some urinary urgency and frequency but he contributes to drinking slot of PO intake. He denies urinary dysuria or hematuria. Nocturia X2-3 and on no Flomax. Bowel movements are normal. He will continue planned RT treatments. 9/10/24- received 4500 cGy in 25 fractions to pelvic nodes and prostate bed. Starts cone down to prostate bed only tomorrow. Tolerating RT well.

## 2024-09-18 ENCOUNTER — NON-APPOINTMENT (OUTPATIENT)
Age: 69
End: 2024-09-18

## 2024-09-18 NOTE — DISEASE MANAGEMENT
[Clinical] : TNM Stage: c [IIC] : IIC [TTNM] : 2 [NTNM] : 0 [MTNM] : 0 [de-identified] : 1801 [de-identified] : 8139 [de-identified] : prostate bed  and nodes

## 2024-09-18 NOTE — HISTORY OF PRESENT ILLNESS
[FreeTextEntry1] : Mr. Ruff is a 69 year old male with no family history of Prostate Ca. presented with PSA 13 ng/ml (according to patient's sister) in 2016.Same year had robotic radical prostatectomy by Dr. Pineda at [facility]. Pathology report not available. In December 2022 underwent insertion of penile implant. PSA by Dr. Kuhn on 4/1/24 was 2.6 ng/ml. CT scan abdomen and pelvis revealed status post prostatectomy, no adenopathy or bone changes. Benign renal cyst. He is experiencing urinary urgency, mild frequency fluid intake related, nocturia 3x, occasional urge incontinence. He denies dysuria or bowel disturbances. He denies wearing pads/depends. Penile implant. Received one injection LHRH agonist. ----  9/18/2024 - OTV Patient has completed 4500/4500 cGy radiation to prostate nodes, 1260/2520 cGy radiation boost. Today patient did not present to clinic post RT. Tried to call pt via  [employer] ([phone]). No answer,  left voicemail for pt.  9/4/2024 - OTV Patient has completed 3960/4500cGy of radiation to the prostate. Patient states he is feeling well and has no urinary issues. 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 He has seen a dermatologist and is using Pacrolimus cream for this. It appears around his underpant/belt line. He offers no other complaints/concerns. He denies dysuria, hematuria, or other urinary symptoms. Patient to continue RT as planned.   # [id number] 8/28/2024-OTV Mr. Ruff has completed 18/39 Fx or 3240/7020cGy to the Prostate, He is experiencing urinary urgency, frequency with weak stream Nocturia X3 I ordered Flomax. He denies dysuria and Bowel movements are normal. He will continue his planned Rt treatments.  8/21/2024 - OTV  # [id number] Patient has received 2340/4500cGy or radiation to the prostate. Patient states he is feeling "fine", he denies dysuria, hematuria, frequency, fatigue, pain or bowel changes. He states he has and continues to have urgency. Patient to continue RT as planned.   8/13/2024- OTV- 1260/7020 cgy, 7/39 fractions. nocturia 2x, stream is good, no dysuria, no frequency, occasional urge incontinence with intermittency. unchanged with RT. Regular BM.  8/8/2024 - OTV  ID# [id number] Patient has received 720/7020cGy of radiation to the Prostate. Today he.is doing well. Has some urinary urgency and frequency but he contributes to drinking slot of PO intake. He denies urinary dysuria or hematuria. Nocturia X2-3 and on no Flomax. Bowel movements are normal. He will continue planned RT treatments. 9/10/24- received 4500 cGy in 25 fractions to pelvic nodes and prostate bed. Starts cone down to prostate bed only tomorrow. Tolerating RT well.

## 2024-09-18 NOTE — HISTORY OF PRESENT ILLNESS
[FreeTextEntry1] : Mr. Ruff is a 69 year old male with no family history of Prostate Ca. presented with PSA 13 ng/ml (according to patient's sister) in 2016.Same year had robotic radical prostatectomy by Dr. Pineda at [facility]. Pathology report not available. In December 2022 underwent insertion of penile implant. PSA by Dr. Kuhn on 4/1/24 was 2.6 ng/ml. CT scan abdomen and pelvis revealed status post prostatectomy, no adenopathy or bone changes. Benign renal cyst. He is experiencing urinary urgency, mild frequency fluid intake related, nocturia 3x, occasional urge incontinence. He denies dysuria or bowel disturbances. He denies wearing pads/depends. Penile implant. Received one injection LHRH agonist. ----  9/18/2024 - OTV Patient has completed 4500/4500 cGy radiation to prostate nodes, 1260/2520 cGy radiation boost. Today patient did not present to clinic post RT. Tried to call pt via  [employer]upur ([phone]). No answer,  left voicemail for pt.  9/4/2024 - OTV Patient has completed 3960/4500cGy of radiation to the prostate. Patient states he is feeling well and has no urinary issues. 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 He has seen a dermatologist and is using Pacrolimus cream for this. It appears around his underpant/belt line. He offers no other complaints/concerns. He denies dysuria, hematuria, or other urinary symptoms. Patient to continue RT as planned.   # [id number] 8/28/2024-OTV Mr. Ruff has completed 18/39 Fx or 3240/7020cGy to the Prostate, He is experiencing urinary urgency, frequency with weak stream Nocturia X3 I ordered Flomax. He denies dysuria and Bowel movements are normal. He will continue his planned Rt treatments.  8/21/2024 - OTV  # [id number] Patient has received 2340/4500cGy or radiation to the prostate. Patient states he is feeling "fine", he denies dysuria, hematuria, frequency, fatigue, pain or bowel changes. He states he has and continues to have urgency. Patient to continue RT as planned.   8/13/2024- OTV- 1260/7020 cgy, 7/39 fractions. nocturia 2x, stream is good, no dysuria, no frequency, occasional urge incontinence with intermittency. unchanged with RT. Regular BM.  8/8/2024 - OTV  ID# [id number] Patient has received 720/7020cGy of radiation to the Prostate. Today he.is doing well. Has some urinary urgency and frequency but he contributes to drinking slot of PO intake. He denies urinary dysuria or hematuria. Nocturia X2-3 and on no Flomax. Bowel movements are normal. He will continue planned RT treatments. 9/10/24- received 4500 cGy in 25 fractions to pelvic nodes and prostate bed. Starts cone down to prostate bed only tomorrow. Tolerating RT well.

## 2024-09-18 NOTE — DISEASE MANAGEMENT
[Clinical] : TNM Stage: c [IIC] : IIC [TTNM] : 2 [NTNM] : 0 [MTNM] : 0 [de-identified] : 6806 [de-identified] : 4481 [de-identified] : prostate bed  and nodes

## 2024-09-25 ENCOUNTER — NON-APPOINTMENT (OUTPATIENT)
Age: 69
End: 2024-09-25

## 2024-09-25 NOTE — REVIEW OF SYSTEMS
[Anal Pain: Grade 0] : Anal Pain: Grade 0 [Constipation: Grade 0] : Constipation: Grade 0 [Diarrhea: Grade 0] : Diarrhea: Grade 0 [Nausea: Grade 0] : Nausea: Grade 0 [Vomiting: Grade 0] : Vomiting: Grade 0 [Fatigue: Grade 0] : Fatigue: Grade 0 [Hematuria: Grade 0] : Hematuria: Grade 0 [Urinary Incontinence: Grade 1 - Occasional (e.g., with coughing, sneezing, etc.), pads not indicated] : Urinary Incontinence: Grade 1 - Occasional (e.g., with coughing, sneezing, etc.), pads not indicated [Urinary Retention: Grade 0] : Urinary Retention: Grade 0 [Urinary Tract Pain: Grade 0] : Urinary Tract Pain: Grade 0 [Urinary Urgency: Grade 1 - Present] : Urinary Urgency: Grade 1 - Present [Urinary Frequency: Grade 1 - Present] : Urinary Frequency: Grade 1 - Present [Dizziness: Grade 0] : Dizziness: Grade 0  [Headache: Grade 0] : Headache: Grade 0 [Anxiety: Grade 0] : Anxiety: Grade 0  [Dermatitis Radiation: Grade 0] : Dermatitis Radiation: Grade 0

## 2024-09-25 NOTE — DISEASE MANAGEMENT
[Clinical] : TNM Stage: c [IIC] : IIC [TTNM] : 2 [NTNM] : 0 [MTNM] : 0 [de-identified] : 6590 [de-identified] : 6639 [de-identified] : prostate bed  and nodes

## 2024-09-25 NOTE — HISTORY OF PRESENT ILLNESS
[FreeTextEntry1] : Mr. Ruff is a 69 year old male with no family history of Prostate Ca. presented with PSA 13 ng/ml (according to patient's sister) in 2016.Same year had robotic radical prostatectomy by Dr. Pineda at [facility]. Pathology report not available. In December 2022 underwent insertion of penile implant. PSA by Dr. Kuhn on 4/1/24 was 2.6 ng/ml. CT scan abdomen and pelvis revealed status post prostatectomy, no adenopathy or bone changes. Benign renal cyst. He is experiencing urinary urgency, mild frequency fluid intake related, nocturia 3x, occasional urge incontinence. He denies dysuria or bowel disturbances. He denies wearing pads/depends. Penile implant. Received one injection LHRH agonist. ---- 9/25/2024 - OTV Patient has received 2160/2520cGy of radiation to the prostate. He states he is doing well. He expresses concern for urgency, and nocturia. Patient was ordered Tamsulosin which he will  today. He denies hematuria, dysuria, skin changes, fatigue or bowel issues. PTE given and explained to patient. Patient to continue RT as planned.   9/18/2024 - OTV Patient has completed 4500/4500 cGy radiation to prostate nodes, 1260/2520 cGy radiation boost. Today patient did not present to clinic post RT. Tried to call pt via  [employer] ([phone]). No answer,  left voicemail for pt.  9/4/2024 - OTV Patient has completed 3960/4500cGy of radiation to the prostate. Patient states he is feeling well and has no urinary issues. 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 He has seen a dermatologist and is using Pacrolimus cream for this. It appears around his underpant/belt line. He offers no other complaints/concerns. He denies dysuria, hematuria, or other urinary symptoms. Patient to continue RT as planned.   # [id number] 8/28/2024-OTV Mr. Ruff has completed 18/39 Fx or 3240/7020cGy to the Prostate, He is experiencing urinary urgency, frequency with weak stream Nocturia X3 I ordered Flomax. He denies dysuria and Bowel movements are normal. He will continue his planned Rt treatments.  8/21/2024 - OTV  # [id number] Patient has received 2340/4500cGy or radiation to the prostate. Patient states he is feeling "fine", he denies dysuria, hematuria, frequency, fatigue, pain or bowel changes. He states he has and continues to have urgency. Patient to continue RT as planned.   8/13/2024- OTV- 1260/7020 cgy, 7/39 fractions. nocturia 2x, stream is good, no dysuria, no frequency, occasional urge incontinence with intermittency. unchanged with RT. Regular BM.  8/8/2024 - OTV  ID# [id number] Patient has received 720/7020cGy of radiation to the Prostate. Today he.is doing well. Has some urinary urgency and frequency but he contributes to drinking slot of PO intake. He denies urinary dysuria or hematuria. Nocturia X2-3 and on no Flomax. Bowel movements are normal. He will continue planned RT treatments. 9/10/24- received 4500 cGy in 25 fractions to pelvic nodes and prostate bed. Starts cone down to prostate bed only tomorrow. Tolerating RT well.

## 2024-10-31 ENCOUNTER — APPOINTMENT (OUTPATIENT)
Dept: RADIATION ONCOLOGY | Facility: CLINIC | Age: 69
End: 2024-10-31

## 2024-11-06 ENCOUNTER — APPOINTMENT (OUTPATIENT)
Dept: RADIATION ONCOLOGY | Facility: CLINIC | Age: 69
End: 2024-11-06
Payer: MEDICARE

## 2024-11-06 ENCOUNTER — NON-APPOINTMENT (OUTPATIENT)
Age: 69
End: 2024-11-06

## 2024-11-06 VITALS
HEART RATE: 86 BPM | OXYGEN SATURATION: 98 % | SYSTOLIC BLOOD PRESSURE: 138 MMHG | TEMPERATURE: 98 F | RESPIRATION RATE: 20 BRPM | DIASTOLIC BLOOD PRESSURE: 77 MMHG

## 2024-11-06 PROCEDURE — 99024 POSTOP FOLLOW-UP VISIT: CPT

## 2025-01-16 ENCOUNTER — RX RENEWAL (OUTPATIENT)
Age: 70
End: 2025-01-16

## 2025-03-13 ENCOUNTER — RX RENEWAL (OUTPATIENT)
Age: 70
End: 2025-03-13

## 2025-04-11 ENCOUNTER — RX RENEWAL (OUTPATIENT)
Age: 70
End: 2025-04-11

## 2025-07-29 ENCOUNTER — RX RENEWAL (OUTPATIENT)
Age: 70
End: 2025-07-29

## 2025-08-26 ENCOUNTER — RX RENEWAL (OUTPATIENT)
Age: 70
End: 2025-08-26